# Patient Record
Sex: FEMALE
[De-identification: names, ages, dates, MRNs, and addresses within clinical notes are randomized per-mention and may not be internally consistent; named-entity substitution may affect disease eponyms.]

---
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
## 2023-04-28 PROBLEM — Z00.00 ENCOUNTER FOR PREVENTIVE HEALTH EXAMINATION: Status: ACTIVE | Noted: 2023-04-28

## 2023-06-19 ENCOUNTER — FORM ENCOUNTER (OUTPATIENT)
Age: 23
End: 2023-06-19

## 2023-06-20 ENCOUNTER — APPOINTMENT (OUTPATIENT)
Dept: MRI IMAGING | Facility: CLINIC | Age: 23
End: 2023-06-20
Payer: COMMERCIAL

## 2023-06-20 ENCOUNTER — APPOINTMENT (OUTPATIENT)
Dept: ORTHOPEDIC SURGERY | Facility: CLINIC | Age: 23
End: 2023-06-20
Payer: COMMERCIAL

## 2023-06-20 VITALS — WEIGHT: 150 LBS | BODY MASS INDEX: 22.22 KG/M2 | HEIGHT: 69 IN

## 2023-06-20 DIAGNOSIS — Z78.9 OTHER SPECIFIED HEALTH STATUS: ICD-10-CM

## 2023-06-20 DIAGNOSIS — M23.91 UNSPECIFIED INTERNAL DERANGEMENT OF RIGHT KNEE: ICD-10-CM

## 2023-06-20 DIAGNOSIS — F12.91 CANNABIS USE, UNSPECIFIED, IN REMISSION: ICD-10-CM

## 2023-06-20 PROCEDURE — 73721 MRI JNT OF LWR EXTRE W/O DYE: CPT | Mod: RT

## 2023-06-20 PROCEDURE — 99213 OFFICE O/P EST LOW 20 MIN: CPT

## 2023-06-20 PROCEDURE — 73564 X-RAY EXAM KNEE 4 OR MORE: CPT | Mod: 50

## 2023-06-20 RX ORDER — DROSPIRENONE AND ETHINYL ESTRADIOL 0.02-3(28)
KIT ORAL
Refills: 0 | Status: ACTIVE | COMMUNITY
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
## 2023-06-20 NOTE — PHYSICAL EXAM
[Left] : left knee [Negative] : negative Rubén's [Right] : right knee [5___] : hamstring 5[unfilled]/5 [Positive] : positive Shiela [] : no extensor lag [Bilateral] : knee bilaterally [AP] : anteroposterior [Lateral] : lateral [[place]ott] : skyline [AP Standing] : anteroposterior standing [There are no fractures, subluxations or dislocations. No significant abnormalities are seen] : There are no fractures, subluxations or dislocations. No significant abnormalities are seen [FreeTextEntry3] : Minimal Valgus [FreeTextEntry8] : TTP Patella Tendon, Quad Tendon [de-identified] : Medial McM [FreeTextEntry9] : Well maintained joint space b/l. Patella tracks slighlty laterally in both knees. No significant osteophytes noted [TWNoteComboBox7] : flexion 130 degrees [de-identified] : extension 0 degrees
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
## 2023-07-13 ENCOUNTER — APPOINTMENT (OUTPATIENT)
Dept: ORTHOPEDIC SURGERY | Facility: CLINIC | Age: 23
End: 2023-07-13
Payer: COMMERCIAL

## 2023-07-13 VITALS — BODY MASS INDEX: 22.22 KG/M2 | WEIGHT: 150 LBS | HEIGHT: 69 IN

## 2023-07-13 DIAGNOSIS — M94.261 CHONDROMALACIA, RIGHT KNEE: ICD-10-CM

## 2023-07-13 DIAGNOSIS — M94.262 CHONDROMALACIA, LEFT KNEE: ICD-10-CM

## 2023-07-13 PROCEDURE — 20610 DRAIN/INJ JOINT/BURSA W/O US: CPT | Mod: RT

## 2023-07-13 PROCEDURE — 99214 OFFICE O/P EST MOD 30 MIN: CPT | Mod: 25

## 2023-07-13 NOTE — PROCEDURE
[Large Joint Injection] : Large joint injection [Right] : of the right [Knee] : knee [Pain] : pain [Inflammation] : inflammation [Betadine] : betadine [Ethyl Chloride sprayed topically] : ethyl chloride sprayed topically [Sterile technique used] : sterile technique used [___ cc    6mg] :  Betamethasone (Celestone) ~Vcc of 6mg [___ cc    1%] : Lidocaine ~Vcc of 1%  [] : Patient tolerated procedure well [Call if redness, pain or fever occur] : call if redness, pain or fever occur [Apply ice for 15min out of every hour for the next 12-24 hours as tolerated] : apply ice for 15 minutes out of every hour for the next 12-24 hours as tolerated [Previous OTC use and PT nontherapeutic] : patient has tried OTC's including aspirin, Ibuprofen, Aleve, etc or prescription NSAIDS, and/or exercises at home and/or physical therapy without satisfactory response [Patient had decreased mobility in the joint] : patient had decreased mobility in the joint [Risks, benefits, alternatives discussed / Verbal consent obtained] : the risks benefits, and alternatives have been discussed, and verbal consent was obtained

## 2023-07-13 NOTE — DATA REVIEWED
[MRI] : MRI [Right] : of the right [Knee] : knee [Report was reviewed and noted in the chart] : The report was reviewed and noted in the chart [FreeTextEntry1] : 1. Chronic MCL sprain at the femur.\par 2. No meniscal tear. No fracture.\par 3. Lateral subluxation of the patella with thickened medial plica and joint effusion. No definitive patellofemoral\par cartilage defect.

## 2023-07-13 NOTE — DISCUSSION/SUMMARY
[de-identified] :  Lengthy discussion regarding options was had with the patient. Nonsurgical options including but not limited to cortisone,viscosupplementation, anti-inflammatory medications, activity modification,  non impact exercise /maintaining a healthy BMI, bracing, and icing were reviewed. Surgical options including but not limited to arthroscopy, and joint replacement were discussed as was risks, benefits and alternatives. All questions were answered. \par \par Plan at this time is for the patient to increase strength in the knee\par \par Patient will return to PT at this time \par \par Patient had CSI in the right knee today \par \par f/u 6 weeks

## 2023-07-13 NOTE — PHYSICAL EXAM
[Left] : left knee [Negative] : negative Rubén's [Right] : right knee [NL (140)] : flexion 140 degrees [NL (0)] : extension 0 degrees [5___] : hamstring 5[unfilled]/5 [Positive] : positive Shiela [] : no extensor lag [Bilateral] : knee bilaterally [AP] : anteroposterior [Lateral] : lateral [[place]] : skyline [AP Standing] : anteroposterior standing [There are no fractures, subluxations or dislocations. No significant abnormalities are seen] : There are no fractures, subluxations or dislocations. No significant abnormalities are seen [FreeTextEntry3] : Minimal Valgus [FreeTextEntry8] : TTP Patella Tendon, Quad Tendon. Active Full Ext [de-identified] : Medial McM [FreeTextEntry9] : Well maintained joint space b/l. Patella tracks slighlty laterally in both knees. No significant osteophytes noted [TWNoteComboBox7] : flexion 130 degrees [de-identified] : extension 0 degrees

## 2023-08-24 ENCOUNTER — APPOINTMENT (OUTPATIENT)
Dept: ORTHOPEDIC SURGERY | Facility: CLINIC | Age: 23
End: 2023-08-24